# Patient Record
Sex: MALE | Race: BLACK OR AFRICAN AMERICAN | Employment: UNEMPLOYED | URBAN - METROPOLITAN AREA
[De-identification: names, ages, dates, MRNs, and addresses within clinical notes are randomized per-mention and may not be internally consistent; named-entity substitution may affect disease eponyms.]

---
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
## 2021-02-04 ENCOUNTER — APPOINTMENT (OUTPATIENT)
Dept: GENERAL RADIOLOGY | Age: 21
End: 2021-02-04
Attending: STUDENT IN AN ORGANIZED HEALTH CARE EDUCATION/TRAINING PROGRAM
Payer: COMMERCIAL

## 2021-02-04 ENCOUNTER — HOSPITAL ENCOUNTER (OUTPATIENT)
Age: 21
Setting detail: OBSERVATION
Discharge: HOME | End: 2021-02-05
Attending: EMERGENCY MEDICINE | Admitting: HOSPITALIST
Payer: COMMERCIAL

## 2021-02-04 ENCOUNTER — APPOINTMENT (OUTPATIENT)
Dept: CT IMAGING | Age: 21
End: 2021-02-04
Attending: STUDENT IN AN ORGANIZED HEALTH CARE EDUCATION/TRAINING PROGRAM
Payer: COMMERCIAL

## 2021-02-04 DIAGNOSIS — R06.02 SOB (SHORTNESS OF BREATH): ICD-10-CM

## 2021-02-04 DIAGNOSIS — I26.99 PULMONARY EMBOLISM, BILATERAL (HCC): Primary | ICD-10-CM

## 2021-02-04 LAB
ANION GAP SERPL CALC-SCNC: 8 MMOL/L (ref 5–15)
BASOPHILS # BLD: 0 K/UL (ref 0–0.1)
BASOPHILS NFR BLD: 0 % (ref 0–1)
BUN SERPL-MCNC: 7 MG/DL (ref 6–20)
BUN/CREAT SERPL: 8 (ref 12–20)
CALCIUM SERPL-MCNC: 9.2 MG/DL (ref 8.5–10.1)
CHLORIDE SERPL-SCNC: 105 MMOL/L (ref 97–108)
CO2 SERPL-SCNC: 25 MMOL/L (ref 21–32)
COMMENT, HOLDF: NORMAL
COMMENT, HOLDF: NORMAL
CREAT SERPL-MCNC: 0.89 MG/DL (ref 0.7–1.3)
D DIMER PPP FEU-MCNC: 2.71 MG/L FEU (ref 0–0.65)
DIFFERENTIAL METHOD BLD: ABNORMAL
EOSINOPHIL # BLD: 0.2 K/UL (ref 0–0.4)
EOSINOPHIL NFR BLD: 2 % (ref 0–7)
ERYTHROCYTE [DISTWIDTH] IN BLOOD BY AUTOMATED COUNT: 13.8 % (ref 11.5–14.5)
FACT VIII ACT/NOR PPP: 381 % (ref 80–200)
GLUCOSE SERPL-MCNC: 84 MG/DL (ref 65–100)
HCT VFR BLD AUTO: 41.6 % (ref 36.6–50.3)
HGB BLD-MCNC: 13.6 G/DL (ref 12.1–17)
IMM GRANULOCYTES # BLD AUTO: 0 K/UL (ref 0–0.04)
IMM GRANULOCYTES NFR BLD AUTO: 0 % (ref 0–0.5)
INR PPP: 1 (ref 0.9–1.1)
LYMPHOCYTES # BLD: 4 K/UL (ref 0.8–3.5)
LYMPHOCYTES NFR BLD: 32 % (ref 12–49)
MCH RBC QN AUTO: 29 PG (ref 26–34)
MCHC RBC AUTO-ENTMCNC: 32.7 G/DL (ref 30–36.5)
MCV RBC AUTO: 88.7 FL (ref 80–99)
MONOCYTES # BLD: 1.5 K/UL (ref 0–1)
MONOCYTES NFR BLD: 12 % (ref 5–13)
NEUTS SEG # BLD: 6.9 K/UL (ref 1.8–8)
NEUTS SEG NFR BLD: 54 % (ref 32–75)
NRBC # BLD: 0 K/UL (ref 0–0.01)
NRBC BLD-RTO: 0 PER 100 WBC
PLATELET # BLD AUTO: 252 K/UL (ref 150–400)
PMV BLD AUTO: 10.4 FL (ref 8.9–12.9)
POTASSIUM SERPL-SCNC: 3.6 MMOL/L (ref 3.5–5.1)
PROTHROMBIN TIME: 10.9 SEC (ref 9–11.1)
RBC # BLD AUTO: 4.69 M/UL (ref 4.1–5.7)
SAMPLES BEING HELD,HOLD: NORMAL
SAMPLES BEING HELD,HOLD: NORMAL
SODIUM SERPL-SCNC: 138 MMOL/L (ref 136–145)
WBC # BLD AUTO: 12.7 K/UL (ref 4.1–11.1)

## 2021-02-04 PROCEDURE — 85305 CLOT INHIBIT PROT S TOTAL: CPT

## 2021-02-04 PROCEDURE — 74011000636 HC RX REV CODE- 636: Performed by: EMERGENCY MEDICINE

## 2021-02-04 PROCEDURE — 71046 X-RAY EXAM CHEST 2 VIEWS: CPT

## 2021-02-04 PROCEDURE — 85613 RUSSELL VIPER VENOM DILUTED: CPT

## 2021-02-04 PROCEDURE — 71275 CT ANGIOGRAPHY CHEST: CPT

## 2021-02-04 PROCEDURE — 86146 BETA-2 GLYCOPROTEIN ANTIBODY: CPT

## 2021-02-04 PROCEDURE — 85379 FIBRIN DEGRADATION QUANT: CPT

## 2021-02-04 PROCEDURE — 85302 CLOT INHIBIT PROT C ANTIGEN: CPT

## 2021-02-04 PROCEDURE — 74011250637 HC RX REV CODE- 250/637: Performed by: EMERGENCY MEDICINE

## 2021-02-04 PROCEDURE — 86147 CARDIOLIPIN ANTIBODY EA IG: CPT

## 2021-02-04 PROCEDURE — 99218 HC RM OBSERVATION: CPT

## 2021-02-04 PROCEDURE — 85610 PROTHROMBIN TIME: CPT

## 2021-02-04 PROCEDURE — 93005 ELECTROCARDIOGRAM TRACING: CPT

## 2021-02-04 PROCEDURE — 80048 BASIC METABOLIC PNL TOTAL CA: CPT

## 2021-02-04 PROCEDURE — 85240 CLOT FACTOR VIII AHG 1 STAGE: CPT

## 2021-02-04 PROCEDURE — 85025 COMPLETE CBC W/AUTO DIFF WBC: CPT

## 2021-02-04 PROCEDURE — 74011250637 HC RX REV CODE- 250/637: Performed by: NURSE PRACTITIONER

## 2021-02-04 PROCEDURE — 96372 THER/PROPH/DIAG INJ SC/IM: CPT

## 2021-02-04 PROCEDURE — 99284 EMERGENCY DEPT VISIT MOD MDM: CPT

## 2021-02-04 PROCEDURE — 65660000000 HC RM CCU STEPDOWN

## 2021-02-04 PROCEDURE — 81240 F2 GENE: CPT

## 2021-02-04 PROCEDURE — 74011250637 HC RX REV CODE- 250/637: Performed by: STUDENT IN AN ORGANIZED HEALTH CARE EDUCATION/TRAINING PROGRAM

## 2021-02-04 PROCEDURE — 85300 ANTITHROMBIN III ACTIVITY: CPT

## 2021-02-04 PROCEDURE — 81241 F5 GENE: CPT

## 2021-02-04 PROCEDURE — 74011250636 HC RX REV CODE- 250/636: Performed by: STUDENT IN AN ORGANIZED HEALTH CARE EDUCATION/TRAINING PROGRAM

## 2021-02-04 PROCEDURE — 36415 COLL VENOUS BLD VENIPUNCTURE: CPT

## 2021-02-04 PROCEDURE — 96374 THER/PROPH/DIAG INJ IV PUSH: CPT

## 2021-02-04 RX ORDER — ACETAMINOPHEN 325 MG/1
650 TABLET ORAL
Status: DISCONTINUED | OUTPATIENT
Start: 2021-02-04 | End: 2021-02-05 | Stop reason: HOSPADM

## 2021-02-04 RX ORDER — SODIUM CHLORIDE 0.9 % (FLUSH) 0.9 %
5-40 SYRINGE (ML) INJECTION EVERY 8 HOURS
Status: DISCONTINUED | OUTPATIENT
Start: 2021-02-04 | End: 2021-02-05 | Stop reason: HOSPADM

## 2021-02-04 RX ORDER — IBUPROFEN 400 MG/1
800 TABLET ORAL
Status: COMPLETED | OUTPATIENT
Start: 2021-02-04 | End: 2021-02-04

## 2021-02-04 RX ORDER — HEPARIN SODIUM 10000 [USP'U]/100ML
10-36 INJECTION, SOLUTION INTRAVENOUS
Status: DISCONTINUED | OUTPATIENT
Start: 2021-02-05 | End: 2021-02-05 | Stop reason: HOSPADM

## 2021-02-04 RX ORDER — ENOXAPARIN SODIUM 150 MG/ML
1 INJECTION SUBCUTANEOUS
Status: COMPLETED | OUTPATIENT
Start: 2021-02-04 | End: 2021-02-04

## 2021-02-04 RX ORDER — MORPHINE SULFATE 2 MG/ML
2 INJECTION, SOLUTION INTRAMUSCULAR; INTRAVENOUS
Status: DISCONTINUED | OUTPATIENT
Start: 2021-02-04 | End: 2021-02-05 | Stop reason: HOSPADM

## 2021-02-04 RX ORDER — ACETAMINOPHEN 500 MG
1000 TABLET ORAL
Status: COMPLETED | OUTPATIENT
Start: 2021-02-04 | End: 2021-02-04

## 2021-02-04 RX ORDER — SODIUM CHLORIDE 0.9 % (FLUSH) 0.9 %
5-40 SYRINGE (ML) INJECTION AS NEEDED
Status: DISCONTINUED | OUTPATIENT
Start: 2021-02-04 | End: 2021-02-05 | Stop reason: HOSPADM

## 2021-02-04 RX ADMIN — ACETAMINOPHEN 650 MG: 325 TABLET ORAL at 21:14

## 2021-02-04 RX ADMIN — ENOXAPARIN SODIUM 140 MG: 150 INJECTION SUBCUTANEOUS at 17:54

## 2021-02-04 RX ADMIN — ACETAMINOPHEN 1000 MG: 500 TABLET ORAL at 16:56

## 2021-02-04 RX ADMIN — IOPAMIDOL 80 ML: 755 INJECTION, SOLUTION INTRAVENOUS at 16:45

## 2021-02-04 RX ADMIN — IBUPROFEN 800 MG: 400 TABLET, FILM COATED ORAL at 14:17

## 2021-02-04 RX ADMIN — Medication 10 ML: at 21:16

## 2021-02-04 NOTE — ED NOTES
TRANSFER - OUT REPORT:    Verbal report given to Rebeca(name) on Jovita Hutchins  being transferred to [facility] room 450(unit) for routine progression of care       Report consisted of patients Situation, Background, Assessment and   Recommendations(SBAR). Information from the following report(s) SBAR, Kardex, ED Summary, Intake/Output, MAR and Recent Results was reviewed with the receiving nurse. Lines:   Peripheral IV 02/04/21 Right Forearm (Active)   Site Assessment Clean, dry, & intact 02/04/21 1552   Phlebitis Assessment 0 02/04/21 1552   Infiltration Assessment 0 02/04/21 1552   Dressing Status Clean, dry, & intact 02/04/21 1552        Opportunity for questions and clarification was provided.       Patient transported with:   [employer]

## 2021-02-04 NOTE — H&P
History & Physical    Primary Care Provider: Kenia Anand MD  Source of Information: Patient     History of Presenting Illness:   Ashia Castellon is a 24 y.o. male who presents with right flank pain that radiates to shoulder for last 2 days. Pain was 6 x 10 exacerbates by lying down on the right side and cough with deep breath. patient was seen at [facility] on 08/07/20 and diagnosed with left peroneal DVT, thrombus in left popliteal and thrombus in posterior tibial and started on Apixaban. Patient was given 2 weeks of Apixaban which he took as prescribed, and was advised to follow-up with hem/onc at [facility]. Patient reports he did not follow-up as recommended and is unsure why this DVT occurred denied any prior surgery, long travel or trauma. Denies known history of any clotting abnormalities. A CTA was done here and found large PE. Patient was given Lovenox 140 mg in the ED and admitted for further management. The patient denies any fever, chills, chest pain, cough, congestion, recent illness, palpitations, or dysuria. Review of Systems:  Pertinent items are noted in the History of Present Illness. Past Medical History:   Diagnosis Date    Asthma     Gastrointestinal disorder     acid reflux    Other ill-defined conditions(589.89)     bronchitis      Past Surgical History:   Procedure Laterality Date    HX HEENT      t& a    HX ORTHOPAEDIC      leg surgery 4/12    NEUROLOGICAL PROCEDURE UNLISTED      Pt had a couple spinal taps     Prior to Admission medications    Medication Sig Start Date End Date Taking? Authorizing Provider   fluticasone (FLONASE) 50 mcg/actuation nasal spray 2 Sprays by Both Nostrils route nightly. use in each nostril 5/20/12   Yaa Temple MD   albuterol (PROVENTIL, VENTOLIN) 90 mcg/actuation inhaler Take 1-2 Puffs by inhalation every six (6) hours as needed. Provider, Historical     No Known Allergies   History reviewed.  No pertinent family history. SOCIAL HISTORY:  Patient resides:  Independently    Assisted Living    SNF    With family care X      Smoking history:   None    Former    Chronic X     Alcohol history:   None X   Social    Chronic      Ambulates:   Independently X   w/cane    w/walker    w/wc    CODE STATUS:  DNR    Full X   Other      Objective:     Physical Exam:     Visit Vitals  BP (!) 147/72   Pulse 89   Temp 97.9 °F (36.6 °C)   Resp 20   Wt 141.9 kg (312 lb 13.3 oz)   SpO2 99%   BMI 41.27 kg/m²      O2 Device: Room air    General:  Alert, cooperative, no distress, appears stated age. Head:  Normocephalic, without obvious abnormality, atraumatic. Eyes:  Conjunctivae/corneas clear. PERRL, EOMs intact. Nose: Nares normal. Septum midline. Mucosa normal. No drainage or sinus tenderness. Throat: Lips, mucosa, and tongue normal. Teeth and gums normal.   Neck: Supple, symmetrical, trachea midline, no adenopathy, thyroid: no enlargement/tenderness/nodules, no carotid bruit and no JVD. Lungs:   Clear to auscultation bilaterally. Heart:  Regular rate and rhythm, S1, S2 normal, no murmur, click, rub or gallop. Abdomen:   Soft, non-tender. Bowel sounds normal. No masses,  No organomegaly. Extremities: Extremities normal, atraumatic, no cyanosis or edema. Pulses: 2+ and symmetric all extremities. Skin: Skin color, texture, turgor normal. No rashes or lesions   Neurologic: CNII-XII intact. EKG:   normal sinus rhythm. Data Review:     Recent Days:  Recent Labs     02/04/21  1433   WBC 12.7*   HGB 13.6   HCT 41.6        Recent Labs     02/04/21  1433      K 3.6      CO2 25   GLU 84   BUN 7   CREA 0.89   CA 9.2     No results for input(s): PH, PCO2, PO2, HCO3, FIO2 in the last 72 hours.     24 Hour Results:  Recent Results (from the past 24 hour(s))   CBC WITH AUTOMATED DIFF    Collection Time: 02/04/21  2:33 PM   Result Value Ref Range    WBC 12.7 (H) 4.1 - 11.1 K/uL RBC 4.69 4.10 - 5.70 M/uL    HGB 13.6 12.1 - 17.0 g/dL    HCT 41.6 36.6 - 50.3 %    MCV 88.7 80.0 - 99.0 FL    MCH 29.0 26.0 - 34.0 PG    MCHC 32.7 30.0 - 36.5 g/dL    RDW 13.8 11.5 - 14.5 %    PLATELET 922 937 - 962 K/uL    MPV 10.4 8.9 - 12.9 FL    NRBC 0.0 0  WBC    ABSOLUTE NRBC 0.00 0.00 - 0.01 K/uL    NEUTROPHILS 54 32 - 75 %    LYMPHOCYTES 32 12 - 49 %    MONOCYTES 12 5 - 13 %    EOSINOPHILS 2 0 - 7 %    BASOPHILS 0 0 - 1 %    IMMATURE GRANULOCYTES 0 0.0 - 0.5 %    ABS. NEUTROPHILS 6.9 1.8 - 8.0 K/UL    ABS. LYMPHOCYTES 4.0 (H) 0.8 - 3.5 K/UL    ABS. MONOCYTES 1.5 (H) 0.0 - 1.0 K/UL    ABS. EOSINOPHILS 0.2 0.0 - 0.4 K/UL    ABS. BASOPHILS 0.0 0.0 - 0.1 K/UL    ABS. IMM. GRANS. 0.0 0.00 - 0.04 K/UL    DF AUTOMATED     D DIMER    Collection Time: 02/04/21  2:33 PM   Result Value Ref Range    D-dimer 2.71 (H) 0.00 - 0.65 mg/L FEU   SAMPLES BEING HELD    Collection Time: 02/04/21  2:33 PM   Result Value Ref Range    SAMPLES BEING HELD 1RED     COMMENT        Add-on orders for these samples will be processed based on acceptable specimen integrity and analyte stability, which may vary by analyte.    METABOLIC PANEL, BASIC    Collection Time: 02/04/21  2:33 PM   Result Value Ref Range    Sodium 138 136 - 145 mmol/L    Potassium 3.6 3.5 - 5.1 mmol/L    Chloride 105 97 - 108 mmol/L    CO2 25 21 - 32 mmol/L    Anion gap 8 5 - 15 mmol/L    Glucose 84 65 - 100 mg/dL    BUN 7 6 - 20 MG/DL    Creatinine 0.89 0.70 - 1.30 MG/DL    BUN/Creatinine ratio 8 (L) 12 - 20      GFR est AA >60 >60 ml/min/1.73m2    GFR est non-AA >60 >60 ml/min/1.73m2    Calcium 9.2 8.5 - 10.1 MG/DL   EKG, 12 LEAD, INITIAL    Collection Time: 02/04/21  4:15 PM   Result Value Ref Range    Ventricular Rate 84 BPM    Atrial Rate 84 BPM    P-R Interval 158 ms    QRS Duration 102 ms    Q-T Interval 354 ms    QTC Calculation (Bezet) 418 ms    Calculated P Axis 18 degrees    Calculated R Axis 75 degrees    Calculated T Axis 9 degrees    Diagnosis Normal sinus rhythm  No previous ECGs available            Imaging:     Xr Chest Pa Lat    Result Date: 2/4/2021  No acute process. Cta Chest W Or W Wo Cont    Result Date: 2/4/2021  1. Moderate burden of bilateral pulmonary embolism, likely acute on subacute. 2. Right middle lobe and right lower lobe subpleural incomplete pulmonary infarcts. Assessment:     Principal Problem:    Pulmonary emboli ([address] (2/4/2021)           Plan:     1. Large pulmonary embolism with pulmonary infarct  -Young patient with unknown etiology possibly need hypercoagulable will work-up  -Consult hematology in the morning  -I will also consult interventional radiology to see if patient is a candidate for removing the clot burden  -Patient was given 1 dose of Lovenox in the ED we will start heparin drip from early morning  -Currently patient is vital stable not requiring any oxygen  -Discussed with patient and patient's father over the phone    2. Smoking history marijuana abuse counseled cessation    3. DVT prophylaxis on Lovenox    4.   Full code ambulate at baseline       Signed By: Lynn Escoto MD     February 4, 2021

## 2021-02-04 NOTE — ED NOTES
Patient educated on admission process. Verbalized understanding and validated understanding with verbal teach back. Hospitalist aware and will admit pt.   Hospital Bed ordered

## 2021-02-04 NOTE — ED NOTES
Pt ambulatory to the bathroom, in no distress. Hospitalist previously at bedside, father and pt able to ask questions and receive plan of care.

## 2021-02-04 NOTE — ED PROVIDER NOTES
Patient is a 24year old male with a PMH of asthma, DVT and GERD who presents to ED c/o right flank pain that radiates to shoulder first appreciated 2 days prior. Patient reports pain is uncomfortable, rating pain 6/10, exacerbated by lying on his right side, cough and with deep breath, not alleviated by any factors. Patient reports pain onset suddenly and denies any injury, heavy lifting or fall. Patient reports shortness of breath secondary to difficulty taking deep breath. Patient also denies any calf pain, fever, chills, difficulty breathing, chest pain, abdominal pain, N/V/D, urinary frequency, dysuria, hematuria and syncope. Patient reports he took Tylenol 500mg earlier today without improvement of pain. On review of patient's records, patient was seen at [facility] on 08/07/20 and diagnosed with left peroneal DVT, thrombus in left popliteal and thrombus in posterior tibial and started on Apixaban. Patient was given 2 weeks of Apixaban which he took as prescribed, and was advised to follow-up with hem/onc at [facility]. Patient reports he did not follow-up as recommended and is unsure why this DVT occurred denied any prior surgery, long travel or trauma. Denies known history of any clotting abnormalities. Social History: Admits to smoking black and milds daily, admits to smoking marijuana. Past Medical History:   Diagnosis Date    Asthma     Gastrointestinal disorder     acid reflux    Other ill-defined conditions(799.89)     bronchitis       Past Surgical History:   Procedure Laterality Date    HX HEENT      t& a    HX ORTHOPAEDIC      leg surgery 4/12    NEUROLOGICAL PROCEDURE UNLISTED      Pt had a couple spinal taps         History reviewed. No pertinent family history.     Social History     Socioeconomic History    Marital status: SINGLE     Spouse name: Not on file    Number of children: Not on file    Years of education: Not on file    Highest education level: Not on file   Occupational History    Not on file   Social Needs    Financial resource strain: Not on file    Food insecurity     Worry: Not on file     Inability: Not on file    Transportation needs     Medical: Not on file     Non-medical: Not on file   Tobacco Use    Smoking status: Current Every Day Smoker    Smokeless tobacco: Never Used    Tobacco comment: balck and milds 1-3   Substance and Sexual Activity    Alcohol use: Yes     Comment: social    Drug use: Yes     Types: Marijuana     Comment: daily    Sexual activity: Not Currently   Lifestyle    Physical activity     Days per week: Not on file     Minutes per session: Not on file    Stress: Not on file   Relationships    Social connections     Talks on phone: Not on file     Gets together: Not on file     Attends Sikhism service: Not on file     Active member of club or organization: Not on file     Attends meetings of clubs or organizations: Not on file     Relationship status: Not on file    Intimate partner violence     Fear of current or ex partner: Not on file     Emotionally abused: Not on file     Physically abused: Not on file     Forced sexual activity: Not on file   Other Topics Concern    Not on file   Social History Narrative    Not on file         ALLERGIES: Patient has no known allergies. Review of Systems   Constitutional: Negative for chills and fever. HENT: Negative for congestion and sore throat. Eyes: Negative for pain and discharge. Respiratory: Positive for shortness of breath. Negative for cough and chest tightness. Cardiovascular: Negative for chest pain and palpitations. Gastrointestinal: Negative for abdominal pain, diarrhea, nausea and vomiting. Genitourinary: Positive for flank pain. Negative for decreased urine volume, difficulty urinating, dysuria, frequency, hematuria and urgency. Musculoskeletal: Positive for back pain. Negative for arthralgias, gait problem, joint swelling, myalgias, neck pain and neck stiffness.   Skin: Negative for color change, rash and wound.   Allergic/Immunologic: Negative for immunocompromised state.   Neurological: Negative for syncope, weakness, light-headedness, numbness and headaches.   Hematological: Does not bruise/bleed easily.   Psychiatric/Behavioral: Negative for confusion.   All other systems reviewed and are negative.      Vitals:    02/04/21 1405   Weight: 141.9 kg (312 lb 13.3 oz)            Physical Exam  Vitals signs and nursing note reviewed.   Constitutional:       Appearance: Normal appearance. He is well-developed.      Comments: Pleasant male in NAD    HENT:      Head: Normocephalic and atraumatic.      Nose: Nose normal.      Mouth/Throat:      Mouth: Mucous membranes are moist.   Eyes:      General: Lids are normal.      Extraocular Movements: Extraocular movements intact.      Conjunctiva/sclera: Conjunctivae normal.   Neck:      Musculoskeletal: Normal range of motion and neck supple. No muscular tenderness.   Cardiovascular:      Rate and Rhythm: Normal rate and regular rhythm.      Pulses: Normal pulses.      Heart sounds: S1 normal and S2 normal. No murmur. No gallop.    Pulmonary:      Effort: Pulmonary effort is normal. No accessory muscle usage or respiratory distress.      Breath sounds: Normal breath sounds. No wheezing or rales.      Comments: Appears in pain with deep breath grabbing right side   Chest:      Chest wall: No tenderness.   Abdominal:      General: Abdomen is flat.      Palpations: Abdomen is soft.      Tenderness: There is no abdominal tenderness. There is no right CVA tenderness or left CVA tenderness.   Musculoskeletal: Normal range of motion.         General: No swelling or tenderness.   Skin:     General: Skin is warm and dry.      Capillary Refill: Capillary refill takes less than 2 seconds.   Neurological:      General: No focal deficit present.      Mental Status: He is alert and oriented to person, place, and time. Mental status is at baseline.  Psychiatric:         Attention and Perception: Attention normal.         Mood and Affect: Mood and affect normal.         Speech: Speech normal.         Behavior: Behavior normal. Behavior is cooperative. Thought Content: Thought content normal.         Cognition and Memory: Cognition normal.         Judgment: Judgment normal.          MDM  Number of Diagnoses or Management Options  Pulmonary embolism, bilateral ([address]  SOB (shortness of breath)  Diagnosis management comments: Patient presents with 2 day history of right flank pain that radiates to shoulder exacerbated by lying on his side, cough and deep breath. No recent illness, no known injury, heavy lifting or fall. VSS, afebrile, HR 98bpm. On exam he appears in pain when taking a deep breath grabbing his right side. Lungs are clear to auscultation bilaterally. No tenderness in the area. Ddx includes musculoskeletal pain, pleuritic chest pain, most likely PE - given history of unprovoked DVT and non compliance with only 2 weeks of treatment and no follow-up with heme/onc, will order basic labs and d-dimer. D-dimer elevated, will order CTA Chest.     CTA Chest: 1. Moderate burden of bilateral pulmonary embolism, likely acute on subacute. 2. Right middle lobe and right lower lobe subpleural incomplete pulmonary infarcts. Patient will need to be admitted to adult hospitalist service given need to be seen by heme/onc for possible hypercoagulable workup. The results of their tests and reasons for their admission have been discussed with them and/or available family. They convey agreement and understanding for the need to be admitted and for their admission diagnosis.   Pt to be admitted to [address] [place] [address] for Admission  5:00 PM    ED Room Number: 02/02  Patient Name and age:  Kathie Jain 24 y.o.  male  Working Diagnosis: Pulmonary embolism, bilateral ([address]  (primary encounter diagnosis)  SOB (shortness of breath)    COVID-19 Suspicion:  no  Sepsis present:  no  Reassessment needed: yes  Code Status:  Full Code  Readmission: no  Isolation Requirements:  no  Recommended Level of Care:  med/surg  Department:[facility]eds ED - [phone]  Other:  Patient with bilateral pulmonary embolism. Had left peritoneal DVT in 08/20 which he was noncompliant -  took Eliquis for 2 weeks and did not f/u with heme/onc as recommended. Will warrant admission to adult hospitalist service given need to be seen by heme/onc for possible hypercoagulable workup. Amount and/or Complexity of Data Reviewed  Clinical lab tests: reviewed  Tests in the radiology section of CPT®: reviewed  Review and summarize past medical records: yes  Discuss the patient with other providers: yes (Dr. Olu Mckeon, ED Attending )      ED Course as of Feb 04 1652   Thu Feb 04, 2021   1525 D DIMER(!):    D-dimer 2.71(!) [KM]   2535 METABOLIC PANEL, BASIC(!):    Sodium 138   Potassium 3.6   Chloride 105   CO2 25   Anion gap 8   Glucose 84   BUN 7   Creatinine 0.89   BUN/Creatinine ratio 8(!)   GFR est AA >60   GFR est non-AA >60   Calcium PENDING [KM]   1525 CBC WITH AUTOMATED DIFF(!):    WBC 12.7(!)   RBC 4.69   HGB 13.6   HCT 41.6   MCV 88.7   MCH 29.0   MCHC 32.7   RDW 13.8   PLATELET 371   MPV 78.4   NRBC 0.0   ABSOLUTE NRBC 0.00   NEUTROPHILS 54   LYMPHOCYTES 32   MONOCYTES 12   EOSINOPHILS 2   BASOPHILS 0   IMMATURE GRANULOCYTES 0   ABS. NEUTROPHILS 6.9   ABS. LYMPHOCYTES 4.0(!)   ABS. MONOCYTES 1.5(!)   ABS. EOSINOPHILS 0.2   ABS. BASOPHILS 0.0   ABS. IMM. GRANS. 0.0   DF AUTOMATED [KM]   1618 EKG: Normal sinus rhythm, Rate 84bpm, , , Qtc 418, normal axis.  Interpreted by Dr. Olu Mckeon at 8039    [KM]      ED Course User Index  [KM] AILIN Kaminski       Procedures

## 2021-02-04 NOTE — ED TRIAGE NOTES
Triage: R flank pain , radiates to lower back and R shoulder, started Tuesday.    No problems voiding

## 2021-02-05 VITALS
TEMPERATURE: 97.6 F | SYSTOLIC BLOOD PRESSURE: 138 MMHG | HEART RATE: 99 BPM | BODY MASS INDEX: 41.01 KG/M2 | OXYGEN SATURATION: 96 % | RESPIRATION RATE: 18 BRPM | WEIGHT: 310.85 LBS | DIASTOLIC BLOOD PRESSURE: 64 MMHG

## 2021-02-05 LAB
APTT PPP: 28.5 SEC (ref 22.1–31)
APTT PPP: 30.4 SEC (ref 22.1–31)
ATRIAL RATE: 84 BPM
B2 GLYCOPROT1 IGA SER-ACNC: <9 GPI IGA UNITS (ref 0–25)
CALCULATED P AXIS, ECG09: 18 DEGREES
CALCULATED R AXIS, ECG10: 75 DEGREES
CALCULATED T AXIS, ECG11: 9 DEGREES
DIAGNOSIS, 93000: NORMAL
INR PPP: 1.1 (ref 0.9–1.1)
P-R INTERVAL, ECG05: 158 MS
PROTHROMBIN TIME: 11 SEC (ref 9–11.1)
Q-T INTERVAL, ECG07: 354 MS
QRS DURATION, ECG06: 102 MS
QTC CALCULATION (BEZET), ECG08: 418 MS
THERAPEUTIC RANGE,PTTT: NORMAL SECS (ref 58–77)
THERAPEUTIC RANGE,PTTT: NORMAL SECS (ref 58–77)
VENTRICULAR RATE, ECG03: 84 BPM

## 2021-02-05 PROCEDURE — 85610 PROTHROMBIN TIME: CPT

## 2021-02-05 PROCEDURE — 74011250637 HC RX REV CODE- 250/637: Performed by: INTERNAL MEDICINE

## 2021-02-05 PROCEDURE — 74011250636 HC RX REV CODE- 250/636: Performed by: INTERNAL MEDICINE

## 2021-02-05 PROCEDURE — 36415 COLL VENOUS BLD VENIPUNCTURE: CPT

## 2021-02-05 PROCEDURE — 96376 TX/PRO/DX INJ SAME DRUG ADON: CPT

## 2021-02-05 PROCEDURE — 99218 HC RM OBSERVATION: CPT

## 2021-02-05 PROCEDURE — 74011250636 HC RX REV CODE- 250/636: Performed by: NURSE PRACTITIONER

## 2021-02-05 PROCEDURE — 96375 TX/PRO/DX INJ NEW DRUG ADDON: CPT

## 2021-02-05 PROCEDURE — 74011250637 HC RX REV CODE- 250/637: Performed by: NURSE PRACTITIONER

## 2021-02-05 PROCEDURE — 96365 THER/PROPH/DIAG IV INF INIT: CPT

## 2021-02-05 PROCEDURE — 74011250636 HC RX REV CODE- 250/636: Performed by: HOSPITALIST

## 2021-02-05 PROCEDURE — 85730 THROMBOPLASTIN TIME PARTIAL: CPT

## 2021-02-05 PROCEDURE — 94760 N-INVAS EAR/PLS OXIMETRY 1: CPT

## 2021-02-05 PROCEDURE — 94761 N-INVAS EAR/PLS OXIMETRY MLT: CPT

## 2021-02-05 PROCEDURE — 96366 THER/PROPH/DIAG IV INF ADDON: CPT

## 2021-02-05 RX ORDER — HEPARIN SODIUM 5000 [USP'U]/ML
10000 INJECTION, SOLUTION INTRAVENOUS; SUBCUTANEOUS ONCE
Status: COMPLETED | OUTPATIENT
Start: 2021-02-05 | End: 2021-02-05

## 2021-02-05 RX ORDER — RIVAROXABAN 15 MG-20MG
KIT ORAL
Qty: 1 DOSE PACK | Refills: 0 | Status: SHIPPED | OUTPATIENT
Start: 2021-02-05

## 2021-02-05 RX ORDER — OXYCODONE AND ACETAMINOPHEN 5; 325 MG/1; MG/1
1 TABLET ORAL
Qty: 12 TAB | Refills: 0 | Status: SHIPPED | OUTPATIENT
Start: 2021-02-05 | End: 2021-02-08

## 2021-02-05 RX ADMIN — MORPHINE SULFATE 2 MG: 2 INJECTION, SOLUTION INTRAMUSCULAR; INTRAVENOUS at 05:20

## 2021-02-05 RX ADMIN — RIVAROXABAN 15 MG: 15 TABLET, FILM COATED ORAL at 16:28

## 2021-02-05 RX ADMIN — Medication 10 ML: at 14:00

## 2021-02-05 RX ADMIN — MORPHINE SULFATE 2 MG: 2 INJECTION, SOLUTION INTRAMUSCULAR; INTRAVENOUS at 00:08

## 2021-02-05 RX ADMIN — ACETAMINOPHEN 650 MG: 325 TABLET ORAL at 16:45

## 2021-02-05 RX ADMIN — MORPHINE SULFATE 2 MG: 2 INJECTION, SOLUTION INTRAMUSCULAR; INTRAVENOUS at 10:39

## 2021-02-05 RX ADMIN — ACETAMINOPHEN 650 MG: 325 TABLET ORAL at 12:49

## 2021-02-05 RX ADMIN — HEPARIN SODIUM AND DEXTROSE 10 UNITS/KG/HR: 10000; 5 INJECTION INTRAVENOUS at 05:17

## 2021-02-05 RX ADMIN — HEPARIN SODIUM 10000 UNITS: 5000 INJECTION INTRAVENOUS; SUBCUTANEOUS at 12:59

## 2021-02-05 NOTE — PROGRESS NOTES
Reason for Admission:   Right shoulder pain                   RUR Score:          9%           Plan for utilizing home health:        None indicated  PCP: First and Last name:  Scarlett Ballesteros   Name of Practice: Pediatrician   Are you a current patient: Yes/No: yes    Approximate date of last visit: about 6 months ago   Can you participate in a virtual visit with your PCP: no                     Current Advanced Directive/Advance Care Plan:   None on file                         Transition of Care Plan:       Home with PCP  Follow-up               CM met with patient - works at [address] [employer] with ADL's and driving- uses [facility] in [address] as his pharmacy - provided patient with Xarelto voucher and rebate form included - patient has transportation to home upon discharge. EFREN Beasley    Care Management Interventions  PCP Verified by CM:  Yes  MyCnortht Signup: No  Discharge Durable Medical Equipment: No  Physical Therapy Consult: No  Occupational Therapy Consult: No  Speech Therapy Consult: No  Current Support Network: Own Home  Discharge Location  Discharge Placement: Home

## 2021-02-05 NOTE — PROGRESS NOTES
TRANSFER - IN REPORT:    Verbal report received from Lupe(name) on Shira Given  being received from ED(unit) for routine progression of care      Report consisted of patients Situation, Background, Assessment and   Recommendations(SBAR). Information from the following report(s) SBAR, Kardex, ED Summary, Intake/Output and Recent Results was reviewed with the receiving nurse. Opportunity for questions and clarification was provided. Assessment completed upon patients arrival to unit and care assumed. 1950 Verbal shift change report given to Eva Gonzalez (oncoming nurse) by Emma Fernandez (offgoing nurse). Report included the following information SBAR, Kardex, ED Summary, Intake/Output and Recent Results.

## 2021-02-05 NOTE — PROGRESS NOTES
0730: Bedside shift change report given to [address] [place] [address],[address] (oncoming nurse) by Mohsen Valencia (offgoing nurse). Report included the following information SBAR, Kardex, Intake/Output, MAR, Recent Results and Cardiac Rhythm NSR.     1645: Tylenol was given early per MD prior to discharge. 1650: I have reviewed discharge instructions with the patient. The patient verbalized understanding. Signed papers are on the chart.

## 2021-02-05 NOTE — PROGRESS NOTES
Pain poorly managed overnight. Patient reportedly did well with motrin and tylenol in ED. Patient reports minimal pain relief this morning with morphine PTT sent again immediatly before hep gtt started. Gtt started 11hrs past lovenox admin.

## 2021-02-05 NOTE — ROUTINE PROCESS
Hospital follow-up PCP transitional care appointment has been scheduled with  for Feb 12 @ 1015AM. Pending patient discharge.   Sebastián Lennon, Care Management Specialist.

## 2021-02-05 NOTE — DISCHARGE SUMMARY
Discharge Summary     PATIENT ID: Essence Archer  MRN: [id number]   YOB: 2000    DATE OF ADMISSION: 2/4/2021  2:02 PM    DATE OF DISCHARGE: 2/5/2021  PRIMARY CARE PROVIDER: Pj Montague MD   DISCHARGING PROVIDER: Shira Archuleta MD    To contact this individual call [phone] and ask the  to page. If unavailable ask to be transferred the Adult Hospitalist Department. CONSULTATIONS: IP CONSULT TO INTERVENTIONAL RADIOLOGY  IP CONSULT TO HEMATOLOGY    PROCEDURES/SURGERIES: * No surgery found *    ADMITTING [address] [place] [address] COURSE:   Essence Archer is a 24 y.o. male who presents with right flank pain that radiates to shoulder for last 2 days. Pain was 6 x 10 exacerbates by lying down on the right side and cough with deep breath. patient was seen at [address] [place] [address] on 08/07/20 and diagnosed with left peroneal DVT, thrombus in left popliteal and thrombus in posterior tibial and started on Apixaban. Patient was given 2 weeks of Apixaban which he took as prescribed, and was advised to follow-up with hem/onc at [address] [place] [address]. Patient reports he did not follow-up as recommended and is unsure why this DVT occurred denied any prior surgery, long travel or trauma. Denies known history of any clotting abnormalities. A CTA was done here and found large PE. Patient was given Lovenox 140 mg in the ED and admitted for further management.      DISCHARGE DIAGNOSES / PLAN:      Large pulmonary embolism with pulmonary infarct  -Young patient with unknown etiology need hypercoagulable work-up, pt's mother also has h/o DVT  -VSS not requiring any supplemental O2, no indication for intervention  -s/p Lovenox, heparin drip  -will start xarelto at d/c to ensure better adherence with once daily dosin d/t h/o nonadherence to eliquis, pt got discount card from CM  INR, PT, PTT wnl  -hypercoag w/u ordered and in process (cardiolipin ab, dkkh1yb, lupus ac, antithombin iii, pr s, pr c, factor ii, fvl) need to followup as outpatient (factor viii falsely elevated as it is an acute phase reactant)     Smoking history marijuana abuse counseled cessation       ADDITIONAL CARE RECOMMENDATIONS: f/u with PCP and hematology for hypercoag w/u results    PENDING TEST RESULTS:   At the time of discharge the following test results are still pending: HYPERCOAGULABLE WORKUP    Patient Instructions     FOLLOW UP APPOINTMENTS:    Follow-up Information     Follow up With Specialties Details Why Contact Info    Ismael Kirkland MD Pediatric Medicine Go on 2/12/2021 Hospital follow up appt has been scheduled for Feb 12 @ 10:15AM [address] [place] [address]  [phone]             DIET: Regular Diet    ACTIVITY: Activity as tolerated    NOTIFY YOUR PHYSICIAN FOR ANY OF THE FOLLOWING:   Fever over 101 degrees for 24 hours. Chest pain, shortness of breath, fever, chills, nausea, vomiting, diarrhea, change in mentation, falling, weakness, bleeding. Severe pain or pain not relieved by medications. Or, any other signs or symptoms that you may have questions about. DISPOSITION:   x Home With:family   OT  PT  HH  RN       Long term SNF/Inpatient Rehab    Independent/assisted living    Hospice    Other:       PATIENT CONDITION AT DISCHARGE:   Functional status    Poor     Deconditioned    x Independent      Cognition   x  Lucid     Forgetful     Dementia      Catheters/lines (plus indication)    Cross     PICC     PEG    x None      Code status   x  Full code     DNR      PHYSICAL EXAMINATION AT DISCHARGE:  General:  Alert, cooperative, no distress, appears stated age. Back:    Symmetric, ROM normal. No CVA tenderness. Lungs:   Clear to auscultation bilaterally, symmetric expansion, no respiratory distress  Chest wall:  No tenderness or deformity.   Heart:   Regular rate and rhythm, no murmur  Abdomen:   Soft, non-tender  Extremities: Extremities normal, atraumatic, no cyanosis or edema  Skin:  Skin color, texture, turgor normal. No rashes or lesions. Neurologic: CNII-XII intact. Normal strength and sensation throughout    [address]:  Problem List as of 2/5/2021 Date Reviewed: 12/10/2013          Codes Class Noted - Resolved    * (Principal) Pulmonary emboli ([facility] ICD-10-CM: I26.99  ICD-9-CM: 415.19  2/4/2021 - Present        Osteomyelitis of lower leg, right, acute ([facility] ICD-10-CM: M86.161  ICD-9-CM: 730.06  5/17/2012 - Present        Staphylococcus aureus bacteremia without sepsis ICD-10-CM: R78.81, B95.61  ICD-9-CM: 790.7, 041.11  5/9/2012 - Present        [place] disease (Chronic) ICD-10-CM: M92.519  ICD-9-CM: 732.4  5/6/2012 - Present        Osteomyelitis of lower leg, right, acute [facility]) ICD-10-CM: M86.161  ICD-9-CM: 730.06  5/6/2012 - Present              DISCHARGE MEDICATIONS:  Current Discharge Medication List      START taking these medications    Details   rivaroxaban (Xarelto DVT-PE Treat 30d Start) 15 mg (42)- 20 mg (9) DsPk Take one 15 mg tablet twice a day with food for the first 21 days. Then, take one 20 mg tablet daily with food thereafter  Indications: a clot in the lung  Qty: 1 Dose Pack, Refills: 0      oxyCODONE-acetaminophen (Percocet) 5-325 mg per tablet Take 1 Tab by mouth every six (6) hours as needed for Pain for up to 3 days. Max Daily Amount: 4 Tabs. Qty: 12 Tab, Refills: 0    Associated Diagnoses: Pulmonary embolism, bilateral (HCC)      rivaroxaban (XARELTO) 20 mg tab tablet Take 1 Tab by mouth daily (with dinner). To start after completing Xarelto Starter Pack  Qty: 30 Tab, Refills: 1         CONTINUE these medications which have NOT CHANGED    Details   fluticasone (FLONASE) 50 mcg/actuation nasal spray 2 Sprays by Both Nostrils route nightly. use in each nostril  Qty: 1 Bottle, Refills: 1      albuterol (PROVENTIL, VENTOLIN) 90 mcg/actuation inhaler Take 1-2 Puffs by inhalation every six (6) hours as needed.          STOP taking these medications       apixaban (Eliquis DVT-PE Treat 30D Start) 5 mg (74 tabs) starter pack Comments:   Reason for Stopping:             Greater than 30 minutes were spent with the patient on counseling and coordination of care    Signed:   Marc Paz MD  2/5/2021  8:47 AM

## 2021-02-06 LAB
AT III PPP CHRO-ACNC: 123 % (ref 75–135)
CARDIOLIPIN IGA SER IA-ACNC: <9 APL U/ML (ref 0–11)
CARDIOLIPIN IGG SER IA-ACNC: <9 GPL U/ML (ref 0–14)
CARDIOLIPIN IGM SER IA-ACNC: <9 MPL U/ML (ref 0–12)
INTERPRETATION, 117893: NORMAL
PROT S AG ACT/NOR PPP IA: 80 % (ref 60–150)
PROT S FREE AG ACT/NOR PPP IA: 96 % (ref 57–157)
SCREEN APTT: 41.1 SEC (ref 0–51.9)
SCREEN DRVVT: 46.2 SEC (ref 0–47)

## 2021-02-06 NOTE — CONSULTS
[address]    Name:  Kingston Mane  MR#:  [id number]  :  2000  ACCOUNT #:  [de-identified]  DATE OF SERVICE:  2021    HEMATOLOGY-ONCOLOGY CONSULT    CHIEF COMPLAINT:  Right flank pain. HISTORY OF PRESENT ILLNESS:  The patient is a very pleasant 71-year-old man. He explained that in 10/2020, he was seen at [facility] and was diagnosed with left lower extremity DVT. He was started on Apixaban, also known as Eliquis, and took this for 2 weeks. However, he explained that the swelling and pain improved, and therefore, he stopped the medication. He was given a followup appointment with Hematology at [facility], but opted not to exercise that option. Unfortunately now, he was noted in the emergency room to have pulmonary embolus on the CT imaging. Specifically, a CT scan done on  at approximately 1644 showed moderate burden bilateral pulmonary embolus, likely acute and subacute, and right middle lobe and right lower lobe subpleural incomplete pulmonary infarcts. Blood test done in the ER revealed that his hemoglobin was 13.6, platelet count 385, white count 12.7. Chemistry showed a normal creatinine. Blood tests have been drawn, that included a factor VIII level that was elevated at 381. Our service was consulted for \"PE. \"    The patient denies any nosebleeds, bright red blood per rectum, black tarry stools. He has never had a blood clot before last 10/2020. Interestingly, his mother has had blood clots and remains on a blood thinner. His sister is in the room with him during the interview. PAST MEDICAL HISTORY:  None. ALLERGIES:  NONE. MEDICATIONS:  He has been started on rivaroxaban, also known as Xarelto, 15 mg p.o. twice daily. SOCIAL HISTORY:  He does smoke tobacco as well as marijuana. He does not use alcohol. FAMILY HISTORY:  Mother with blood clot. REVIEW OF SYSTEMS:  GENERAL:  No fevers or chills.   HEENT:  No auditory or visual change. LYMPHATIC:  No lumps or bumps in neck, underarm, or groin. CARDIOVASCULAR:  As above. PULMONARY:  As above. GASTROINTESTINAL:  No abdominal pain, diarrhea, or constipation. GENITOURINARY:  No dysuria or incontinence. SKIN:  No rash or changing mole. MUSCULOSKELETAL:  No red or swollen joints. NEUROLOGIC:  No irritability or syncope. PHYSICAL EXAMINATION:  GENERAL:  Pleasant, in no acute distress. VITAL SIGNS:  /64, pulse 96 on room air. HEENT:  Sclerae anicteric. Oropharynx clear. NECK:  Supple without lymphadenopathy or thyromegaly. HEART:  Regular rate and rhythm without murmur, rub, or gallop. LUNGS:  Clear to auscultation bilaterally. He is splinting on deep inspiration. ABDOMEN:  Nontender, nondistended. Normoactive bowel sounds. No hepatosplenomegaly. EXTREMITIES:  No clubbing, cyanosis, or edema. PSYCHIATRIC:  Normal mood and affect. Alert and oriented x3. ASSESSMENT AND PLAN:  A 70-year-old man with pulmonary embolus several months after inadequately treated deep vein thrombosis. Our service asked to consult on the length of anticoagulation and hypercoagulable workup. Pulmonary embolus:  I have given him my contact information and offered to see him or come into my clinic to discuss with him the length of anticoagulation and his hypercoagulable workup. He and his sister were given the opportunity to ask questions, which I answered to their satisfaction. I agree with rivaroxaban load 15 mg p.o. b.i.d. x3 weeks, followed by 20 mg once daily for at least 3 months. Given the unprovoked nature of his clots, it may be prudent to consider a longer course of anticoagulation, especially in light of his maternal family history. Thanks a lot.       MD GENEVIEVE [id number]  D:  02/05/2021 16:58  T:  02/05/2021 21:01  JOB #:  [id number]

## 2021-02-07 LAB — PROT C AG PPP IA-ACNC: 78 % (ref 60–150)

## 2021-02-09 LAB
F2 GENE MUT ANL BLD/T: NORMAL
F5 GENE MUT ANL BLD/T: NORMAL

## 2021-02-22 NOTE — ADT AUTH CERT NOTES
PREVIOUSLY DENIED FOR INPATIENT DOWNGRADED TO OBSERVATION REF # [id number] PLEASE FAX FORM OR CALL BACK TO NOTIFY IF  AUTHORIZATION FOR OBSERVATION IS OR IS NOT REQUIRED  PHONE # [phone]  FAX # [phone]

## 2022-01-09 ENCOUNTER — HOSPITAL ENCOUNTER (EMERGENCY)
Age: 22
Discharge: ANOTHER HOSPITAL | End: 2022-01-09
Attending: EMERGENCY MEDICINE
Payer: COMMERCIAL

## 2022-01-09 ENCOUNTER — APPOINTMENT (OUTPATIENT)
Dept: GENERAL RADIOLOGY | Age: 22
End: 2022-01-09
Attending: EMERGENCY MEDICINE
Payer: COMMERCIAL

## 2022-01-09 VITALS
BODY MASS INDEX: 39.14 KG/M2 | HEIGHT: 74 IN | TEMPERATURE: 97.8 F | DIASTOLIC BLOOD PRESSURE: 77 MMHG | RESPIRATION RATE: 17 BRPM | HEART RATE: 87 BPM | SYSTOLIC BLOOD PRESSURE: 140 MMHG | WEIGHT: 305 LBS | OXYGEN SATURATION: 94 %
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
## 2022-01-09 PROCEDURE — 71045 X-RAY EXAM CHEST 1 VIEW: CPT

## 2022-01-09 PROCEDURE — 74011250636 HC RX REV CODE- 250/636: Performed by: EMERGENCY MEDICINE

## 2022-01-09 PROCEDURE — 96374 THER/PROPH/DIAG INJ IV PUSH: CPT

## 2022-01-09 PROCEDURE — 77010033678 HC OXYGEN DAILY

## 2022-01-09 PROCEDURE — 99283 EMERGENCY DEPT VISIT LOW MDM: CPT

## 2022-01-09 RX ORDER — FENTANYL CITRATE 50 UG/ML
100 INJECTION, SOLUTION INTRAMUSCULAR; INTRAVENOUS
Status: COMPLETED | OUTPATIENT
Start: 2022-01-09 | End: 2022-01-09

## 2022-01-09 RX ORDER — SODIUM CHLORIDE 9 MG/ML
1000 INJECTION, SOLUTION INTRAVENOUS ONCE
Status: COMPLETED | OUTPATIENT
Start: 2022-01-09 | End: 2022-01-09

## 2022-01-09 RX ADMIN — SODIUM CHLORIDE 1000 ML: 9 INJECTION, SOLUTION INTRAVENOUS at 05:29

## 2022-01-09 RX ADMIN — FENTANYL CITRATE 100 MCG: 50 INJECTION INTRAMUSCULAR; INTRAVENOUS at 05:27
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
## 2022-03-18 PROBLEM — I26.99 PULMONARY EMBOLI (HCC): Status: ACTIVE | Noted: 2021-02-04

## 2023-07-29 ENCOUNTER — HOSPITAL ENCOUNTER (EMERGENCY)
Facility: HOSPITAL | Age: 23
Discharge: HOME | End: 2023-07-29
Attending: EMERGENCY MEDICINE
Payer: COMMERCIAL

## 2023-07-29 ENCOUNTER — APPOINTMENT (OUTPATIENT)
Facility: HOSPITAL | Age: 23
End: 2023-07-29
Payer: COMMERCIAL

## 2023-07-29 VITALS
HEART RATE: 86 BPM | SYSTOLIC BLOOD PRESSURE: 145 MMHG | DIASTOLIC BLOOD PRESSURE: 73 MMHG | TEMPERATURE: 99 F | RESPIRATION RATE: 20 BRPM | HEIGHT: 74 IN | OXYGEN SATURATION: 99 % | WEIGHT: 305 LBS | BODY MASS INDEX: 39.14 KG/M2

## 2023-07-29 DIAGNOSIS — I26.99 ACUTE PULMONARY EMBOLISM, UNSPECIFIED PULMONARY EMBOLISM TYPE, UNSPECIFIED WHETHER ACUTE COR PULMONALE PRESENT (HCC): Primary | ICD-10-CM

## 2023-07-29 LAB
ALBUMIN SERPL-MCNC: 3.6 G/DL (ref 3.5–5)
ALBUMIN/GLOB SERPL: 0.8 (ref 1.1–2.2)
ALP SERPL-CCNC: 83 U/L (ref 45–117)
ALT SERPL-CCNC: 39 U/L (ref 12–78)
ANION GAP SERPL CALC-SCNC: 7 MMOL/L (ref 5–15)
AST SERPL-CCNC: 39 U/L (ref 15–37)
BASOPHILS # BLD: 0 K/UL (ref 0–0.1)
BASOPHILS NFR BLD: 0 % (ref 0–1)
BILIRUB SERPL-MCNC: 0.5 MG/DL (ref 0.2–1)
BUN SERPL-MCNC: 9 MG/DL (ref 6–20)
BUN/CREAT SERPL: 8 (ref 12–20)
CALCIUM SERPL-MCNC: 9.1 MG/DL (ref 8.5–10.1)
CHLORIDE SERPL-SCNC: 101 MMOL/L (ref 97–108)
CO2 SERPL-SCNC: 28 MMOL/L (ref 21–32)
CREAT SERPL-MCNC: 1.1 MG/DL (ref 0.7–1.3)
D DIMER PPP FEU-MCNC: 4.13 MG/L FEU (ref 0–0.65)
DIFFERENTIAL METHOD BLD: NORMAL
EKG ATRIAL RATE: 80 BPM
EKG DIAGNOSIS: NORMAL
EKG P AXIS: 41 DEGREES
EKG P-R INTERVAL: 160 MS
EKG Q-T INTERVAL: 338 MS
EKG QRS DURATION: 92 MS
EKG QTC CALCULATION (BAZETT): 389 MS
EKG R AXIS: 61 DEGREES
EKG T AXIS: 24 DEGREES
EKG VENTRICULAR RATE: 80 BPM
EOSINOPHIL # BLD: 0.2 K/UL (ref 0–0.4)
EOSINOPHIL NFR BLD: 2 % (ref 0–7)
ERYTHROCYTE [DISTWIDTH] IN BLOOD BY AUTOMATED COUNT: 13.5 % (ref 11.5–14.5)
GLOBULIN SER CALC-MCNC: 4.8 G/DL (ref 2–4)
GLUCOSE SERPL-MCNC: 90 MG/DL (ref 65–100)
HCT VFR BLD AUTO: 44.3 % (ref 36.6–50.3)
HGB BLD-MCNC: 14.7 G/DL (ref 12.1–17)
IMM GRANULOCYTES # BLD AUTO: 0 K/UL (ref 0–0.04)
IMM GRANULOCYTES NFR BLD AUTO: 0 % (ref 0–0.5)
LYMPHOCYTES # BLD: 2.3 K/UL (ref 0.8–3.5)
LYMPHOCYTES NFR BLD: 29 % (ref 12–49)
MCH RBC QN AUTO: 29.9 PG (ref 26–34)
MCHC RBC AUTO-ENTMCNC: 33.2 G/DL (ref 30–36.5)
MCV RBC AUTO: 90.2 FL (ref 80–99)
MONOCYTES # BLD: 0.9 K/UL (ref 0–1)
MONOCYTES NFR BLD: 11 % (ref 5–13)
NEUTS SEG # BLD: 4.5 K/UL (ref 1.8–8)
NEUTS SEG NFR BLD: 58 % (ref 32–75)
NRBC # BLD: 0 K/UL (ref 0–0.01)
NRBC BLD-RTO: 0 PER 100 WBC
NT PRO BNP: 56 PG/ML (ref 0–125)
PLATELET # BLD AUTO: 224 K/UL (ref 150–400)
PMV BLD AUTO: 10 FL (ref 8.9–12.9)
POTASSIUM SERPL-SCNC: 4.4 MMOL/L (ref 3.5–5.1)
PROT SERPL-MCNC: 8.4 G/DL (ref 6.4–8.2)
RBC # BLD AUTO: 4.91 M/UL (ref 4.1–5.7)
SODIUM SERPL-SCNC: 136 MMOL/L (ref 136–145)
TROPONIN I SERPL HS-MCNC: 4 NG/L (ref 0–76)
WBC # BLD AUTO: 7.9 K/UL (ref 4.1–11.1)

## 2023-07-29 PROCEDURE — 99285 EMERGENCY DEPT VISIT HI MDM: CPT

## 2023-07-29 PROCEDURE — 93005 ELECTROCARDIOGRAM TRACING: CPT | Performed by: EMERGENCY MEDICINE

## 2023-07-29 PROCEDURE — 6370000000 HC RX 637 (ALT 250 FOR IP): Performed by: NURSE PRACTITIONER

## 2023-07-29 PROCEDURE — 6360000004 HC RX CONTRAST MEDICATION: Performed by: NURSE PRACTITIONER

## 2023-07-29 PROCEDURE — 84484 ASSAY OF TROPONIN QUANT: CPT

## 2023-07-29 PROCEDURE — 36415 COLL VENOUS BLD VENIPUNCTURE: CPT

## 2023-07-29 PROCEDURE — 85025 COMPLETE CBC W/AUTO DIFF WBC: CPT

## 2023-07-29 PROCEDURE — 83880 ASSAY OF NATRIURETIC PEPTIDE: CPT

## 2023-07-29 PROCEDURE — 71275 CT ANGIOGRAPHY CHEST: CPT

## 2023-07-29 PROCEDURE — 80053 COMPREHEN METABOLIC PANEL: CPT

## 2023-07-29 PROCEDURE — 85379 FIBRIN DEGRADATION QUANT: CPT

## 2023-07-29 RX ADMIN — IOPAMIDOL 100 ML: 755 INJECTION, SOLUTION INTRAVENOUS at 20:07

## 2023-07-29 RX ADMIN — APIXABAN 5 MG: 5 TABLET, FILM COATED ORAL at 21:54

## 2023-07-29 ASSESSMENT — LIFESTYLE VARIABLES
HOW OFTEN DO YOU HAVE A DRINK CONTAINING ALCOHOL: 2-3 TIMES A WEEK
HOW MANY STANDARD DRINKS CONTAINING ALCOHOL DO YOU HAVE ON A TYPICAL DAY: 3 OR 4

## 2023-07-29 NOTE — ED TRIAGE NOTES
Pt arrives from home with cc of left flank pain for 1 day. He denies chest pain, any urinary symptoms or changes in bms. Aggravating factors include cough and deep breaths. Hx of DVT.  He is concerned for PE.

## 2023-07-30 ASSESSMENT — ENCOUNTER SYMPTOMS
BACK PAIN: 0
SHORTNESS OF BREATH: 0
ABDOMINAL PAIN: 0

## 2023-07-30 NOTE — ED NOTES
Discharge instructions were given to the patient by Gia Gurrola RN    The patient left the Emergency Department ambulatory, alert and oriented and in no acute distress with 1 prescriptions. The patient was encouraged to call or return to the ED for worsening issues or problems and was encouraged to schedule a follow up appointment for continuing care. The patient verbalized understanding of discharge instructions and prescriptions, all questions were answered. The patient has no further concerns at this time.          Rocael Valdez RN  07/29/23 3458

## 2023-08-01 LAB
EKG ATRIAL RATE: 80 BPM
EKG DIAGNOSIS: NORMAL
EKG P AXIS: 41 DEGREES
EKG P-R INTERVAL: 160 MS
EKG Q-T INTERVAL: 338 MS
EKG QRS DURATION: 92 MS
EKG QTC CALCULATION (BAZETT): 389 MS
EKG R AXIS: 61 DEGREES
EKG T AXIS: 24 DEGREES
EKG VENTRICULAR RATE: 80 BPM

## 2023-08-01 PROCEDURE — 93010 ELECTROCARDIOGRAM REPORT: CPT | Performed by: SPECIALIST

## 2023-08-04 ENCOUNTER — FOLLOWUP TELEPHONE ENCOUNTER (OUTPATIENT)
Facility: HOSPITAL | Age: 23
End: 2023-08-04

## 2023-08-04 NOTE — TELEPHONE ENCOUNTER
CM receive afterhour consult patient diagnosed with PE on Eliquis has no PCP will need follow-up.     CM called patient he states he has an appointment on 8/22/23 with [facility]

## 2023-11-11 ENCOUNTER — HOSPITAL ENCOUNTER (EMERGENCY)
Facility: HOSPITAL | Age: 23
Discharge: HOME | End: 2023-11-11
Attending: EMERGENCY MEDICINE
Payer: COMMERCIAL

## 2023-11-11 VITALS
RESPIRATION RATE: 18 BRPM | SYSTOLIC BLOOD PRESSURE: 158 MMHG | DIASTOLIC BLOOD PRESSURE: 82 MMHG | BODY MASS INDEX: 38.5 KG/M2 | HEART RATE: 82 BPM | OXYGEN SATURATION: 97 % | WEIGHT: 300 LBS | TEMPERATURE: 98.7 F | HEIGHT: 74 IN

## 2023-11-11 DIAGNOSIS — L02.91 ABSCESS: Primary | ICD-10-CM

## 2023-11-11 PROCEDURE — 99283 EMERGENCY DEPT VISIT LOW MDM: CPT

## 2023-11-11 PROCEDURE — 6370000000 HC RX 637 (ALT 250 FOR IP): Performed by: EMERGENCY MEDICINE

## 2023-11-11 PROCEDURE — 2500000003 HC RX 250 WO HCPCS: Performed by: EMERGENCY MEDICINE

## 2023-11-11 PROCEDURE — 10061 I&D ABSCESS COMP/MULTIPLE: CPT

## 2023-11-11 RX ORDER — LIDOCAINE HYDROCHLORIDE AND EPINEPHRINE 10; 10 MG/ML; UG/ML
20 INJECTION, SOLUTION INFILTRATION; PERINEURAL ONCE
Status: COMPLETED | OUTPATIENT
Start: 2023-11-11 | End: 2023-11-11

## 2023-11-11 RX ORDER — DOXYCYCLINE HYCLATE 100 MG
100 TABLET ORAL 2 TIMES DAILY
Qty: 14 TABLET | Refills: 0 | Status: SHIPPED | OUTPATIENT
Start: 2023-11-11 | End: 2023-11-18

## 2023-11-11 RX ORDER — IBUPROFEN 800 MG/1
800 TABLET ORAL EVERY 8 HOURS PRN
Qty: 30 TABLET | Refills: 0 | Status: SHIPPED | OUTPATIENT
Start: 2023-11-11

## 2023-11-11 RX ORDER — HYDROCODONE BITARTRATE AND ACETAMINOPHEN 5; 325 MG/1; MG/1
1 TABLET ORAL
Status: COMPLETED | OUTPATIENT
Start: 2023-11-11 | End: 2023-11-11

## 2023-11-11 RX ADMIN — HYDROCODONE BITARTRATE AND ACETAMINOPHEN 1 TABLET: 5; 325 TABLET ORAL at 08:03

## 2023-11-11 RX ADMIN — LIDOCAINE HYDROCHLORIDE,EPINEPHRINE BITARTRATE 20 ML: 10; .01 INJECTION, SOLUTION INFILTRATION; PERINEURAL at 08:03

## 2023-11-11 ASSESSMENT — PAIN DESCRIPTION - ORIENTATION: ORIENTATION: LEFT

## 2023-11-11 ASSESSMENT — PAIN - FUNCTIONAL ASSESSMENT: PAIN_FUNCTIONAL_ASSESSMENT: 0-10

## 2023-11-11 ASSESSMENT — PAIN SCALES - GENERAL: PAINLEVEL_OUTOF10: 8

## 2023-11-11 ASSESSMENT — PAIN DESCRIPTION - DESCRIPTORS: DESCRIPTORS: ACHING;SORE

## 2023-11-11 ASSESSMENT — PAIN DESCRIPTION - LOCATION: LOCATION: GROIN

## 2023-11-11 NOTE — ED PROVIDER NOTES
[facility] EMERGENCY DEPT  EMERGENCY DEPARTMENT ENCOUNTER    Patient Name: Zach Keyes MRN: [id number]  YOB: 2000  Provider: Radha Lopez MD  PCP: None, None   Time/Date of evaluation: 7:47 AM EST on 11/11/23    History of Presenting Illness     Chief Complaint   Patient presents with    Abscess     History Provided by: Patient   History is limited by: Nothing    HISTORY (Narrative): Zach Keyes is a 26 y.o. male with a PMHX of asthma and GERD  who presents to the emergency department (room 9) by POV C/O left abdominal wall abscess for 3 days. Patient endorses some mild spontaneous drainage that happened last night but now states the abscess is more painful and getting larger. He denies taking any medications for the symptoms. Nursing Notes were all reviewed and agreed with or any disagreements were addressed in the HPI. Past History     PAST MEDICAL HISTORY:  Past Medical History:   Diagnosis Date    Asthma     Gastrointestinal disorder     acid reflux    Other ill-defined conditions(459.89)     bronchitis       PAST SURGICAL HISTORY:  Past Surgical History:   Procedure Laterality Date    HEENT      t& a    NEUROLOGICAL SURGERY      Pt had a couple spinal taps    ORTHOPEDIC SURGERY      leg surgery 4/12       FAMILY HISTORY:  History reviewed. No pertinent family history. SOCIAL HISTORY:  Social History     Tobacco Use    Smoking status: Never    Smokeless tobacco: Never    Tobacco comments:     Quit smoking: balck and milds 1-3   Substance Use Topics    Alcohol use: Yes     Comment: on the weekends    Drug use: Yes     Types: Marijuana Lisa Deleon       MEDICATIONS:  No current facility-administered medications for this encounter.      Current Outpatient Medications   Medication Sig Dispense Refill    doxycycline hyclate (VIBRA-TABS) 100 MG tablet Take 1 tablet by mouth 2 times daily for 7 days 14 tablet 0    ibuprofen (ADVIL;MOTRIN) 800 MG tablet Take 1 tablet by mouth every 8 clinician of record. Dragon Disclaimer     Please note that this dictation was completed with [employer], the computer voice recognition software. Quite often unanticipated grammatical, syntax, homophones, and other interpretive errors are inadvertently transcribed by the computer software. Please disregard these errors. Please excuse any errors that have escaped final proofreading.     Raven Contreras MD  (Electronically signed)           Lulú Castillo MD  11/11/23 0099

## 2024-01-25 ENCOUNTER — HOSPITAL ENCOUNTER (EMERGENCY)
Facility: HOSPITAL | Age: 24
Discharge: HOME | End: 2024-01-25
Attending: STUDENT IN AN ORGANIZED HEALTH CARE EDUCATION/TRAINING PROGRAM
Payer: COMMERCIAL

## 2024-01-25 ENCOUNTER — HOSPITAL ENCOUNTER (EMERGENCY)
Facility: HOSPITAL | Age: 24
End: 2024-01-25
Payer: COMMERCIAL

## 2024-01-25 VITALS
DIASTOLIC BLOOD PRESSURE: 61 MMHG | TEMPERATURE: 98.3 F | WEIGHT: 308.86 LBS | HEIGHT: 74 IN | SYSTOLIC BLOOD PRESSURE: 146 MMHG | HEART RATE: 61 BPM | BODY MASS INDEX: 39.64 KG/M2 | OXYGEN SATURATION: 100 % | RESPIRATION RATE: 20 BRPM

## 2024-01-25 DIAGNOSIS — I26.94 MULTIPLE SUBSEGMENTAL PULMONARY EMBOLI WITHOUT ACUTE COR PULMONALE (HCC): Primary | ICD-10-CM

## 2024-01-25 LAB
ALBUMIN SERPL-MCNC: 3.7 G/DL (ref 3.5–5)
ALBUMIN/GLOB SERPL: 0.8 (ref 1.1–2.2)
ALP SERPL-CCNC: 72 U/L (ref 45–117)
ALT SERPL-CCNC: 35 U/L (ref 12–78)
ANION GAP SERPL CALC-SCNC: 1 MMOL/L (ref 5–15)
AST SERPL-CCNC: 16 U/L (ref 15–37)
BASOPHILS # BLD: 0 K/UL (ref 0–0.1)
BASOPHILS NFR BLD: 0 % (ref 0–1)
BILIRUB SERPL-MCNC: 0.2 MG/DL (ref 0.2–1)
BUN SERPL-MCNC: 10 MG/DL (ref 6–20)
BUN/CREAT SERPL: 10 (ref 12–20)
CALCIUM SERPL-MCNC: 9.2 MG/DL (ref 8.5–10.1)
CHLORIDE SERPL-SCNC: 106 MMOL/L (ref 97–108)
CO2 SERPL-SCNC: 30 MMOL/L (ref 21–32)
COMMENT:: NORMAL
CREAT SERPL-MCNC: 0.99 MG/DL (ref 0.7–1.3)
DIFFERENTIAL METHOD BLD: NORMAL
EOSINOPHIL # BLD: 0.1 K/UL (ref 0–0.4)
EOSINOPHIL NFR BLD: 2 % (ref 0–7)
ERYTHROCYTE [DISTWIDTH] IN BLOOD BY AUTOMATED COUNT: 13.2 % (ref 11.5–14.5)
GLOBULIN SER CALC-MCNC: 4.6 G/DL (ref 2–4)
GLUCOSE SERPL-MCNC: 110 MG/DL (ref 65–100)
HCT VFR BLD AUTO: 43 % (ref 36.6–50.3)
HGB BLD-MCNC: 13.9 G/DL (ref 12.1–17)
IMM GRANULOCYTES # BLD AUTO: 0 K/UL (ref 0–0.04)
IMM GRANULOCYTES NFR BLD AUTO: 0 % (ref 0–0.5)
LYMPHOCYTES # BLD: 2.1 K/UL (ref 0.8–3.5)
LYMPHOCYTES NFR BLD: 26 % (ref 12–49)
MCH RBC QN AUTO: 29.3 PG (ref 26–34)
MCHC RBC AUTO-ENTMCNC: 32.3 G/DL (ref 30–36.5)
MCV RBC AUTO: 90.5 FL (ref 80–99)
MONOCYTES # BLD: 0.9 K/UL (ref 0–1)
MONOCYTES NFR BLD: 11 % (ref 5–13)
NEUTS SEG # BLD: 4.8 K/UL (ref 1.8–8)
NEUTS SEG NFR BLD: 61 % (ref 32–75)
NRBC # BLD: 0 K/UL (ref 0–0.01)
NRBC BLD-RTO: 0 PER 100 WBC
PLATELET # BLD AUTO: 245 K/UL (ref 150–400)
PMV BLD AUTO: 10.1 FL (ref 8.9–12.9)
POTASSIUM SERPL-SCNC: 3.9 MMOL/L (ref 3.5–5.1)
PROT SERPL-MCNC: 8.3 G/DL (ref 6.4–8.2)
RBC # BLD AUTO: 4.75 M/UL (ref 4.1–5.7)
SODIUM SERPL-SCNC: 137 MMOL/L (ref 136–145)
SPECIMEN HOLD: NORMAL
WBC # BLD AUTO: 7.9 K/UL (ref 4.1–11.1)

## 2024-01-25 PROCEDURE — 99284 EMERGENCY DEPT VISIT MOD MDM: CPT

## 2024-01-25 PROCEDURE — 85025 COMPLETE CBC W/AUTO DIFF WBC: CPT

## 2024-01-25 PROCEDURE — 93005 ELECTROCARDIOGRAM TRACING: CPT | Performed by: STUDENT IN AN ORGANIZED HEALTH CARE EDUCATION/TRAINING PROGRAM

## 2024-01-25 PROCEDURE — 80053 COMPREHEN METABOLIC PANEL: CPT

## 2024-01-25 PROCEDURE — 36415 COLL VENOUS BLD VENIPUNCTURE: CPT

## 2024-01-25 PROCEDURE — 6360000004 HC RX CONTRAST MEDICATION: Performed by: RADIOLOGY

## 2024-01-25 PROCEDURE — 99285 EMERGENCY DEPT VISIT HI MDM: CPT

## 2024-01-25 PROCEDURE — 71275 CT ANGIOGRAPHY CHEST: CPT

## 2024-01-25 RX ADMIN — IOPAMIDOL 100 ML: 755 INJECTION, SOLUTION INTRAVENOUS at 12:50

## 2024-01-25 ASSESSMENT — PAIN DESCRIPTION - DESCRIPTORS: DESCRIPTORS: DISCOMFORT

## 2024-01-25 ASSESSMENT — ENCOUNTER SYMPTOMS
NAUSEA: 0
SORE THROAT: 0
COUGH: 1
RHINORRHEA: 0
DIARRHEA: 0
ABDOMINAL PAIN: 0
VOMITING: 0
SHORTNESS OF BREATH: 1

## 2024-01-25 ASSESSMENT — PAIN DESCRIPTION - FREQUENCY: FREQUENCY: CONTINUOUS

## 2024-01-25 ASSESSMENT — PAIN DESCRIPTION - LOCATION: LOCATION: CHEST

## 2024-01-25 ASSESSMENT — PAIN - FUNCTIONAL ASSESSMENT
PAIN_FUNCTIONAL_ASSESSMENT: ACTIVITIES ARE NOT PREVENTED
PAIN_FUNCTIONAL_ASSESSMENT: 0-10

## 2024-01-25 ASSESSMENT — PAIN DESCRIPTION - ONSET: ONSET: ON-GOING

## 2024-01-25 ASSESSMENT — PAIN SCALES - GENERAL: PAINLEVEL_OUTOF10: 7

## 2024-01-25 ASSESSMENT — PAIN DESCRIPTION - PAIN TYPE: TYPE: ACUTE PAIN

## 2024-01-25 ASSESSMENT — PAIN DESCRIPTION - ORIENTATION: ORIENTATION: LEFT

## 2024-01-25 NOTE — ED TRIAGE NOTES
Pt comes to ED from home with reports of painful left side inspiration. Hx of PE. Pt takes eliquis, but has been off of it for 1.5 months. Started back taking it on 1/17.    ALEJANDRINA Howard assessing pt in triage.

## 2024-01-25 NOTE — DISCHARGE INSTRUCTIONS
Take Eliquis 10mg twice a day for 7 days then resume current dosage as directed   Call hematology to schedule a follow up appointment  Continue to monitor symptoms, return to the ED for worsening pain, difficulty breathing, syncope  Follow up with PCP as needed

## 2024-01-25 NOTE — ED PROVIDER NOTES
[facility] EMERGENCY DEP  EMERGENCY DEPARTMENT ENCOUNTER      Pt Name: Jan Bella Jr.  MRN: [id number]  Birthdate 2000  Date of evaluation: 1/25/2024  Provider: Anita Romero PA-C    CHIEF COMPLAINT       Chief Complaint   Patient presents with    Shortness of Breath         HISTORY OF PRESENT ILLNESS   (Location/Symptom, Timing/Onset, Context/Setting, Quality, Duration, Modifying Factors, Severity)  Note limiting factors.   Pt is a 24 yo M with PMHx of PE on Eliquis, asthma, GERD who presents to the ED for pleuritic left sided chest pain and associated shortness of breath since two days. Also notes mild cough. Pt also reports pain with movement of left arm. Pt states that he ran out of his Eliquis for 1.5 -2 months and finally got it refilled, started taking it for one week. States symptoms feel similar to PE in the past. Reports family history of DVT. Denies being evaluated by hematology for blood disorders. Denies fever, chills, nausea, vomiting, diarrhea, leg swelling, or pain.            Review of External Medical Records:     Nursing Notes were reviewed.    REVIEW OF SYSTEMS    (2-9 systems for level 4, 10 or more for level 5)     Review of Systems   Constitutional:  Negative for chills and fever.   HENT:  Negative for congestion, rhinorrhea and sore throat.    Respiratory:  Positive for cough and shortness of breath.    Cardiovascular:  Positive for chest pain.   Gastrointestinal:  Negative for abdominal pain, diarrhea, nausea and vomiting.   Genitourinary:  Negative for dysuria, frequency and hematuria.   Musculoskeletal:  Negative for myalgias.   Neurological:  Negative for dizziness, weakness and headaches.       Except as noted above the remainder of the review of systems was reviewed and negative.       PAST MEDICAL HISTORY     Past Medical History:   Diagnosis Date    Asthma     Gastrointestinal disorder     acid reflux    Other ill-defined conditions(799.89)     bronchitis         SURGICAL

## 2024-01-26 LAB
EKG ATRIAL RATE: 73 BPM
EKG DIAGNOSIS: NORMAL
EKG P AXIS: 36 DEGREES
EKG P-R INTERVAL: 146 MS
EKG Q-T INTERVAL: 362 MS
EKG QRS DURATION: 104 MS
EKG QTC CALCULATION (BAZETT): 398 MS
EKG R AXIS: 106 DEGREES
EKG T AXIS: 2 DEGREES
EKG VENTRICULAR RATE: 73 BPM

## 2024-01-26 PROCEDURE — 93010 ELECTROCARDIOGRAM REPORT: CPT | Performed by: INTERNAL MEDICINE
